# Patient Record
Sex: MALE | Race: WHITE | NOT HISPANIC OR LATINO | Employment: STUDENT | URBAN - METROPOLITAN AREA
[De-identification: names, ages, dates, MRNs, and addresses within clinical notes are randomized per-mention and may not be internally consistent; named-entity substitution may affect disease eponyms.]

---

## 2023-12-20 ENCOUNTER — OFFICE VISIT (OUTPATIENT)
Dept: OTOLARYNGOLOGY | Facility: CLINIC | Age: 18
End: 2023-12-20
Payer: COMMERCIAL

## 2023-12-20 VITALS — TEMPERATURE: 97.3 F | WEIGHT: 152 LBS | BODY MASS INDEX: 21.76 KG/M2 | HEIGHT: 70 IN

## 2023-12-20 DIAGNOSIS — J34.2 DEVIATED NASAL SEPTUM: Primary | ICD-10-CM

## 2023-12-20 DIAGNOSIS — J34.3 NASAL TURBINATE HYPERTROPHY: ICD-10-CM

## 2023-12-20 DIAGNOSIS — R04.0 EPISTAXIS: ICD-10-CM

## 2023-12-20 PROCEDURE — 1036F TOBACCO NON-USER: CPT | Performed by: OTOLARYNGOLOGY

## 2023-12-20 PROCEDURE — 99203 OFFICE O/P NEW LOW 30 MIN: CPT | Performed by: OTOLARYNGOLOGY

## 2023-12-20 ASSESSMENT — PATIENT HEALTH QUESTIONNAIRE - PHQ9
1. LITTLE INTEREST OR PLEASURE IN DOING THINGS: NOT AT ALL
SUM OF ALL RESPONSES TO PHQ9 QUESTIONS 1 & 2: 0
2. FEELING DOWN, DEPRESSED OR HOPELESS: NOT AT ALL

## 2023-12-20 NOTE — PROGRESS NOTES
"Chief Complaint   Patient presents with    New Patient Visit     NP NOSE BLEEDS FOR A LONG TIME, SEASONAL WHEN DRY      Date of Evaluation: 12/20/2023   HPI  Rory Harris is a 18 y.o. male with a history of recurrent anterior bilateral epistaxis.  No other bleeding or bruising elsewhere.  Particularly in the wintertime he gets epistaxis.  His last episode was 1 month ago.  He is aware of some difficulty breathing through the nose.  He has more frequent sinus infections and nasal congestion.  There is some question of some nasal trauma playing soccer and a childhood incident       Past Medical History:   Diagnosis Date    Encounter for immunization 05/12/2020    Need for vaccination    Encounter for immunization 11/04/2016    Encounter for immunization    Nasal congestion 04/10/2015    Nasal congestion    Pain in right hip 02/27/2015    Hip pain, right    Personal history of other diseases of the digestive system     History of esophageal reflux    Personal history of other diseases of the respiratory system 09/22/2019    History of sore throat    Personal history of other diseases of the respiratory system 11/30/2017    History of acute sinusitis    Personal history of other diseases of the respiratory system 11/30/2017    History of sore throat    Personal history of other specified conditions 01/31/2021    History of syncope    Underweight 01/31/2021    Underweight    Unspecified nonsuppurative otitis media, bilateral 11/30/2017    Bilateral serous otitis media      Past Surgical History:   Procedure Laterality Date    OTHER SURGICAL HISTORY  01/26/2021    Pectus excavatum repair          Medications:   No current outpatient medications     Allergies:  No Known Allergies     Physical Exam:  Last Recorded Vitals  Temperature 36.3 °C (97.3 °F), height 1.778 m (5' 10\"), weight 68.9 kg (152 lb).  []General appearance: Well-developed, well-nourished in no acute distress, conversant with normal voice " quality    Head/face: No erythema or edema or facial tenderness, and normal facial nerve function bilaterally    External ear: Clear external auditory canals with normal pinnae  Tube status: N/A  Middle ear: Tympanic membranes intact and mobile, middle ears normal.  Tympanic membrane perforation: N/A  Mastoid bowl: N/A  Hearing: Normal conversational awareness at normal speech thresholds    Nose visualized using: Anterior rhinoscopy  Nasal dorsum: Nontraumatic midline appearance  Septum: Severely deviated towards the right obstructing 90% of the airway.  Prominent blood vessels on the anterior septum bilaterally.  No active bleeding.  Inferior turbinates: Normal, pink, hypertrophied  Secretions: Thicker crusty secretions suctioned from the anterior nasal cavity    Oral cavity and oropharynx: Normal  Teeth: Good condition  Floor of mouth: without lesions  Palate: Normal hard palate, soft palate and uvula  Oropharynx: Clear, no lesions present  Buccal mucosa: Normal without masses or lesions  Lips: Normal    Nasopharynx: Inadequate mirror exam secondary to gag/anatomy    Neck:  Salivary glands: Normal bilateral parotid and submandibular glands by inspection and palpation.  Non-thyroid masses: No palpable masses or significant lymphadenopathy  Trachea: Midline  Thyroid: No thyromegaly or palpable nodules  Temporomandibular joint: Nontender  Cervical range of motion: Normal    Neurologic exam: Alert and oriented x3, appropriate affect.  Cranial nerves II-XII normal bilaterally  Extraocular movement: Extraocular movement intact, normal gaze alignment        Rory was seen today for new patient visit.  Diagnoses and all orders for this visit:  Deviated nasal septum (Primary)  Epistaxis  Nasal turbinate hypertrophy       PLAN  I believe the underlying cause of his epistaxis is his severely deviated nasal septum.  He has not had bleeding for a month from the nose.  I did discuss the option of cauterization versus  moisturizing with saline gel.  He would like to do saline gel.  He is aware of his degree of nasal obstruction and the impact this has had on the epistaxis.  I do not believe trying a nasal steroid would be of any benefit and may likely exacerbate the epistaxis given the severity of the deviated septum.  I did discuss the option of septoplasty and turbinate surgery to improve his airway and secondarily I think this may also help his recurrent epistaxis and sinus infections.  He is at college at this point and may want to schedule this in the near future over a break.  I discussed the surgery in detail including the risks and benefits and he and his mother will consider this option and let me know    Db Wells MD

## 2024-05-29 ENCOUNTER — DOCUMENTATION (OUTPATIENT)
Dept: OTOLARYNGOLOGY | Facility: HOSPITAL | Age: 19
End: 2024-05-29
Payer: COMMERCIAL

## 2024-05-29 NOTE — PROGRESS NOTES
OP NOTE     Date: 2024  OR Location: [facility]    Name: Rory Harris : 2005 Age: 18 y.o. MRN: [id number]  Sex: male      Diagnosis  Pre-op Diagnosis  Deviated nasal septum and turbinate hypertrophy Post-op Diagnosis     Same     Procedures  Septoplasty  Bilateral submucous resection of the inferior turbinates  Bilateral outfracture of the inferior turbinates    Surgeon:      Db Wells MD       Procedure Summary  Anesthesia: General  Specimen:   none    Operative indication: This is a 18-year-old white male with chronic nasal obstruction and recurrent epistaxis with a severely deviated nasal septum and large inferior turbinates.  The above-mentioned surgery was recommended    Operative report: The patient was brought to the operating room and placed on the operating table in the supine position.  After blood pressure and cardiac monitors were applied the patient was placed under general anesthesia and orally intubated.  The nasal cavity was decongested with cottonoid pledgets soaked in Edgar-Synephrine.  1% lidocaine with 1 100,000 epinephrine was infiltrated into the nasal septum and lateral nasal wall bilaterally.  After allowing for vasoconstriction a hemitransfixion incision was made in the left caudal aspect of the septum.  The mucoperichondrium/mucoperiosteum was elevated off of the left side of the septum.  The bony cartilaginous junction was identified.  An incision was made just anterior to this junction.  The right sided posterior mucoperiosteum was then elevated off of the posterior septum.  Numerous deformities were resected from the posterior bony septum.  The posterior inferior aspect of the quadrangular cartilage was trimmed.  The maxillary crest was lowered.  The quadrangular cartilage was rotated back into midline and now the septum was straight and nonobstructing.  The hemitransfixion incision was closed with 4-0 chromic suture.  Several transseptal quilting sutures  were passed with 4-0 chromic suture.  Attention was then directed to the inferior turbinates.  They were infiltrated with saline.  An incision was made in the anterior aspect of the inferior turbinate and a submucosal plane was elevated with a lauryn elevator.  The microdebrider with coagulation was then used to perform submucous resection.  This was carried out bilaterally.  The inferior turbinates were then outfractured bilaterally.  Silastic splints were cut and placed onto each side of the nasal septum and sewn in place with a Prolene suture.  The patient was allowed to awaken from anesthesia and was extubated in the operating room.  He was transported to the postanesthesia care unit in stable condition having tolerated the procedure well.  There were no complications.    Findings: Severely deviated nasal septum towards the left    Complications: None    Estimated blood loss: Minimal    Disposition: PACU      05/29/24 at 9:41 AM - Db Wells MD

## 2024-06-04 ENCOUNTER — OFFICE VISIT (OUTPATIENT)
Dept: OTOLARYNGOLOGY | Facility: CLINIC | Age: 19
End: 2024-06-04
Payer: COMMERCIAL

## 2024-06-04 VITALS — WEIGHT: 152 LBS | HEIGHT: 70 IN | BODY MASS INDEX: 21.76 KG/M2

## 2024-06-04 DIAGNOSIS — J34.3 NASAL TURBINATE HYPERTROPHY: ICD-10-CM

## 2024-06-04 DIAGNOSIS — J34.2 DEVIATED NASAL SEPTUM: Primary | ICD-10-CM

## 2024-06-04 PROCEDURE — 99024 POSTOP FOLLOW-UP VISIT: CPT | Performed by: OTOLARYNGOLOGY

## 2024-06-04 PROCEDURE — 1036F TOBACCO NON-USER: CPT | Performed by: OTOLARYNGOLOGY

## 2024-06-04 NOTE — PROGRESS NOTES
"Chief Complaint   Patient presents with    Post-op     DOS: 5/29/24 SEPTO/TURBS      Date of Evaluation: 6/4/2024   HPI  He returns status post septoplasty and turbinate surgery May 29, 2024.  Rory Harris is a 18 y.o. male with a history of recurrent anterior bilateral epistaxis.  No other bleeding or bruising elsewhere.  Particularly in the wintertime he gets epistaxis.  His last episode was 1 month ago.  He is aware of some difficulty breathing through the nose.  He has more frequent sinus infections and nasal congestion.  There is some question of some nasal trauma playing soccer and a childhood incident       Past Medical History:   Diagnosis Date    Encounter for immunization 05/12/2020    Need for vaccination    Encounter for immunization 11/04/2016    Encounter for immunization    Nasal congestion 04/10/2015    Nasal congestion    Pain in right hip 02/27/2015    Hip pain, right    Personal history of other diseases of the digestive system     History of esophageal reflux    Personal history of other diseases of the respiratory system 09/22/2019    History of sore throat    Personal history of other diseases of the respiratory system 11/30/2017    History of acute sinusitis    Personal history of other diseases of the respiratory system 11/30/2017    History of sore throat    Personal history of other specified conditions 01/31/2021    History of syncope    Underweight 01/31/2021    Underweight    Unspecified nonsuppurative otitis media, bilateral 11/30/2017    Bilateral serous otitis media      Past Surgical History:   Procedure Laterality Date    OTHER SURGICAL HISTORY  01/26/2021    Pectus excavatum repair          Medications:   No current outpatient medications     Allergies:  No Known Allergies     Physical Exam:  Last Recorded Vitals  Height 1.778 m (5' 10\"), weight 68.9 kg (152 lb).  []General appearance: Well-developed, well-nourished in no acute distress, conversant with normal voice " quality    Head/face: No erythema or edema or facial tenderness, and normal facial nerve function bilaterally    External ear: Clear external auditory canals with normal pinnae  Tube status: N/A  Middle ear: Tympanic membranes intact and mobile, middle ears normal.  Tympanic membrane perforation: N/A  Mastoid bowl: N/A  Hearing: Normal conversational awareness at normal speech thresholds    Nose visualized using: Anterior rhinoscopy  Nasal dorsum: Nontraumatic midline appearance  Septum: Splints removed.  Septum straight.  Turbinates debrided.  Inferior turbinates: Normal, pink, hypertrophied  Secretions: Dry    Oral cavity and oropharynx: Normal  Teeth: Good condition  Floor of mouth: without lesions  Palate: Normal hard palate, soft palate and uvula  Oropharynx: Clear, no lesions present  Buccal mucosa: Normal without masses or lesions  Lips: Normal    Nasopharynx: Inadequate mirror exam secondary to gag/anatomy    Neck:  Salivary glands: Normal bilateral parotid and submandibular glands by inspection and palpation.  Non-thyroid masses: No palpable masses or significant lymphadenopathy  Trachea: Midline  Thyroid: No thyromegaly or palpable nodules  Temporomandibular joint: Nontender  Cervical range of motion: Normal    Neurologic exam: Alert and oriented x3, appropriate affect.  Cranial nerves II-XII normal bilaterally  Extraocular movement: Extraocular movement intact, normal gaze alignment        There are no diagnoses linked to this encounter.       PLAN  Continue saline nasal spray.  Recheck in 1 week    Db Wells MD

## 2024-06-12 ENCOUNTER — APPOINTMENT (OUTPATIENT)
Dept: OTOLARYNGOLOGY | Facility: CLINIC | Age: 19
End: 2024-06-12
Payer: COMMERCIAL

## 2024-06-12 VITALS — TEMPERATURE: 100.2 F | WEIGHT: 152 LBS | HEIGHT: 70 IN | BODY MASS INDEX: 21.76 KG/M2

## 2024-06-12 DIAGNOSIS — J01.90 ACUTE NON-RECURRENT SINUSITIS, UNSPECIFIED LOCATION: ICD-10-CM

## 2024-06-12 DIAGNOSIS — J34.2 DEVIATED NASAL SEPTUM: Primary | ICD-10-CM

## 2024-06-12 DIAGNOSIS — B99.9 INFECTION: ICD-10-CM

## 2024-06-12 DIAGNOSIS — J34.3 NASAL TURBINATE HYPERTROPHY: ICD-10-CM

## 2024-06-12 PROCEDURE — 1036F TOBACCO NON-USER: CPT | Performed by: OTOLARYNGOLOGY

## 2024-06-12 PROCEDURE — 99024 POSTOP FOLLOW-UP VISIT: CPT | Performed by: OTOLARYNGOLOGY

## 2024-06-12 RX ORDER — AMOXICILLIN AND CLAVULANATE POTASSIUM 875; 125 MG/1; MG/1
1 TABLET, FILM COATED ORAL 2 TIMES DAILY
Qty: 20 TABLET | Refills: 0 | Status: SHIPPED | OUTPATIENT
Start: 2024-06-12 | End: 2024-06-22

## 2024-06-12 NOTE — PROGRESS NOTES
Chief Complaint   Patient presents with    Follow-up     1 WK F/U SINUS CK, S/P SEPTO/TURBS, HAD NOSEBLEED YESTERDAY      Date of Evaluation: 6/12/2024   HPI  He returns status post septoplasty and turbinate surgery May 29, 2024.  He had epistaxis on the right yesterday.  He is still congested  Roryyudy Harris is a 18 y.o. male with a history of recurrent anterior bilateral epistaxis.  No other bleeding or bruising elsewhere.  Particularly in the wintertime he gets epistaxis.  His last episode was 1 month ago.  He is aware of some difficulty breathing through the nose.  He has more frequent sinus infections and nasal congestion.  There is some question of some nasal trauma playing soccer and a childhood incident       Past Medical History:   Diagnosis Date    Encounter for immunization 05/12/2020    Need for vaccination    Encounter for immunization 11/04/2016    Encounter for immunization    Nasal congestion 04/10/2015    Nasal congestion    Pain in right hip 02/27/2015    Hip pain, right    Personal history of other diseases of the digestive system     History of esophageal reflux    Personal history of other diseases of the respiratory system 09/22/2019    History of sore throat    Personal history of other diseases of the respiratory system 11/30/2017    History of acute sinusitis    Personal history of other diseases of the respiratory system 11/30/2017    History of sore throat    Personal history of other specified conditions 01/31/2021    History of syncope    Underweight 01/31/2021    Underweight    Unspecified nonsuppurative otitis media, bilateral 11/30/2017    Bilateral serous otitis media      Past Surgical History:   Procedure Laterality Date    OTHER SURGICAL HISTORY  01/26/2021    Pectus excavatum repair          Medications:   Current Outpatient Medications   Medication Instructions    amoxicillin-pot clavulanate (Augmentin) 875-125 mg tablet 1 tablet, oral, 2 times daily        Allergies:  No  "Known Allergies     Physical Exam:  Last Recorded Vitals  Temperature 37.9 °C (100.2 °F), height 1.778 m (5' 10\"), weight 68.9 kg (152 lb).  []General appearance: Well-developed, well-nourished in no acute distress, conversant with normal voice quality    Head/face: No erythema or edema or facial tenderness, and normal facial nerve function bilaterally    External ear: Clear external auditory canals with normal pinnae  Tube status: N/A  Middle ear: Tympanic membranes intact and mobile, middle ears normal.  Tympanic membrane perforation: N/A  Mastoid bowl: N/A  Hearing: Normal conversational awareness at normal speech thresholds    Nose visualized using: Anterior rhinoscopy  Nasal dorsum: Nontraumatic midline appearance  Septum: Septum straight.  Prominent blood vessel cauterized on the right anterior septum.  Purulence bilaterally  turbinates: Normal, pink, hypertrophied  Secretions: Dry    Oral cavity and oropharynx: Normal  Teeth: Good condition  Floor of mouth: without lesions  Palate: Normal hard palate, soft palate and uvula  Oropharynx: Clear, no lesions present  Buccal mucosa: Normal without masses or lesions  Lips: Normal    Nasopharynx: Inadequate mirror exam secondary to gag/anatomy    Neck:  Salivary glands: Normal bilateral parotid and submandibular glands by inspection and palpation.  Non-thyroid masses: No palpable masses or significant lymphadenopathy  Trachea: Midline  Thyroid: No thyromegaly or palpable nodules  Temporomandibular joint: Nontender  Cervical range of motion: Normal    Neurologic exam: Alert and oriented x3, appropriate affect.  Cranial nerves II-XII normal bilaterally  Extraocular movement: Extraocular movement intact, normal gaze alignment        Rory was seen today for follow-up.  Diagnoses and all orders for this visit:  Deviated nasal septum (Primary)  Nasal turbinate hypertrophy  Acute non-recurrent sinusitis, unspecified location  Infection  -     amoxicillin-pot clavulanate " (Augmentin) 875-125 mg tablet; Take 1 tablet by mouth 2 times a day for 10 days.         PLAN  Continue saline nasal spray.  Recheck in 1 week.  Augmentin for 1 week.  Right anterior septal vessel cauterized with silver nitrate.  Tolerated well    Db Wells MD

## 2024-06-18 ENCOUNTER — APPOINTMENT (OUTPATIENT)
Dept: OTOLARYNGOLOGY | Facility: CLINIC | Age: 19
End: 2024-06-18
Payer: COMMERCIAL

## 2024-06-18 VITALS — BODY MASS INDEX: 21.76 KG/M2 | WEIGHT: 152 LBS | HEIGHT: 70 IN

## 2024-06-18 DIAGNOSIS — J34.3 NASAL TURBINATE HYPERTROPHY: ICD-10-CM

## 2024-06-18 DIAGNOSIS — J34.2 DEVIATED NASAL SEPTUM: Primary | ICD-10-CM

## 2024-06-18 PROCEDURE — 1036F TOBACCO NON-USER: CPT | Performed by: OTOLARYNGOLOGY

## 2024-06-18 PROCEDURE — 99024 POSTOP FOLLOW-UP VISIT: CPT | Performed by: OTOLARYNGOLOGY

## 2024-06-18 NOTE — PROGRESS NOTES
Chief Complaint   Patient presents with    Follow-up     EP- 1 WK F/U SEPTO/TURBS NOSE BLEEDS      Date of Evaluation: 6/18/2024   HPI  He returns status post septoplasty and turbinate surgery May 29, 2024.  Breathing better   Rory Harris is a 18 y.o. male with a history of recurrent anterior bilateral epistaxis.  No other bleeding or bruising elsewhere.  Particularly in the wintertime he gets epistaxis.  His last episode was 1 month ago.  He is aware of some difficulty breathing through the nose.  He has more frequent sinus infections and nasal congestion.  There is some question of some nasal trauma playing soccer and a childhood incident       Past Medical History:   Diagnosis Date    Encounter for immunization 05/12/2020    Need for vaccination    Encounter for immunization 11/04/2016    Encounter for immunization    Nasal congestion 04/10/2015    Nasal congestion    Pain in right hip 02/27/2015    Hip pain, right    Personal history of other diseases of the digestive system     History of esophageal reflux    Personal history of other diseases of the respiratory system 09/22/2019    History of sore throat    Personal history of other diseases of the respiratory system 11/30/2017    History of acute sinusitis    Personal history of other diseases of the respiratory system 11/30/2017    History of sore throat    Personal history of other specified conditions 01/31/2021    History of syncope    Underweight 01/31/2021    Underweight    Unspecified nonsuppurative otitis media, bilateral 11/30/2017    Bilateral serous otitis media      Past Surgical History:   Procedure Laterality Date    OTHER SURGICAL HISTORY  01/26/2021    Pectus excavatum repair          Medications:   Current Outpatient Medications   Medication Instructions    amoxicillin-pot clavulanate (Augmentin) 875-125 mg tablet 1 tablet, oral, 2 times daily        Allergies:  No Known Allergies     Physical Exam:  Last Recorded Vitals  Height 1.778 m  "(5' 10\"), weight 68.9 kg (152 lb).  []General appearance: Well-developed, well-nourished in no acute distress, conversant with normal voice quality    Head/face: No erythema or edema or facial tenderness, and normal facial nerve function bilaterally    External ear: Clear external auditory canals with normal pinnae  Tube status: N/A  Middle ear: Tympanic membranes intact and mobile, middle ears normal.  Tympanic membrane perforation: N/A  Mastoid bowl: N/A  Hearing: Normal conversational awareness at normal speech thresholds    Nose visualized using: Anterior rhinoscopy  Nasal dorsum: Nontraumatic midline appearance  Septum: Septum straight.  Nose suctioned to clear   turbinates: Normal, pink, hypertrophied  Secretions: Dry    Oral cavity and oropharynx: Normal  Teeth: Good condition  Floor of mouth: without lesions  Palate: Normal hard palate, soft palate and uvula  Oropharynx: Clear, no lesions present  Buccal mucosa: Normal without masses or lesions  Lips: Normal    Nasopharynx: Inadequate mirror exam secondary to gag/anatomy    Neck:  Salivary glands: Normal bilateral parotid and submandibular glands by inspection and palpation.  Non-thyroid masses: No palpable masses or significant lymphadenopathy  Trachea: Midline  Thyroid: No thyromegaly or palpable nodules  Temporomandibular joint: Nontender  Cervical range of motion: Normal    Neurologic exam: Alert and oriented x3, appropriate affect.  Cranial nerves II-XII normal bilaterally  Extraocular movement: Extraocular movement intact, normal gaze alignment        There are no diagnoses linked to this encounter.         PLAN  Continue saline nasal spray.  Recheck in 1 week.    Db Wells MD    "

## 2024-06-25 ENCOUNTER — APPOINTMENT (OUTPATIENT)
Dept: OTOLARYNGOLOGY | Facility: CLINIC | Age: 19
End: 2024-06-25
Payer: COMMERCIAL

## 2024-06-25 VITALS — BODY MASS INDEX: 21.76 KG/M2 | WEIGHT: 152 LBS | HEIGHT: 70 IN

## 2024-06-25 DIAGNOSIS — J34.3 NASAL TURBINATE HYPERTROPHY: ICD-10-CM

## 2024-06-25 DIAGNOSIS — J34.2 DEVIATED NASAL SEPTUM: Primary | ICD-10-CM

## 2024-06-25 PROCEDURE — 99024 POSTOP FOLLOW-UP VISIT: CPT | Performed by: OTOLARYNGOLOGY

## 2024-06-25 PROCEDURE — 1036F TOBACCO NON-USER: CPT | Performed by: OTOLARYNGOLOGY

## 2024-06-25 NOTE — PROGRESS NOTES
"Chief Complaint   Patient presents with    Follow-up     EP- 1 WK F/U SEPTO/TURBS, NOSEBLEEDS      Date of Evaluation: 6/25/2024   HPI  He returns status post septoplasty and turbinate surgery May 29, 2024.  Breathing better.  He had a small episode of epistaxis on the right when he pushed himself last week  Rory Harris is a 18 y.o. male with a history of recurrent anterior bilateral epistaxis.  No other bleeding or bruising elsewhere.  Particularly in the wintertime he gets epistaxis.  His last episode was 1 month ago.  He is aware of some difficulty breathing through the nose.  He has more frequent sinus infections and nasal congestion.  There is some question of some nasal trauma playing soccer and a childhood incident       Past Medical History:   Diagnosis Date    Encounter for immunization 05/12/2020    Need for vaccination    Encounter for immunization 11/04/2016    Encounter for immunization    Nasal congestion 04/10/2015    Nasal congestion    Pain in right hip 02/27/2015    Hip pain, right    Personal history of other diseases of the digestive system     History of esophageal reflux    Personal history of other diseases of the respiratory system 09/22/2019    History of sore throat    Personal history of other diseases of the respiratory system 11/30/2017    History of acute sinusitis    Personal history of other diseases of the respiratory system 11/30/2017    History of sore throat    Personal history of other specified conditions 01/31/2021    History of syncope    Underweight 01/31/2021    Underweight    Unspecified nonsuppurative otitis media, bilateral 11/30/2017    Bilateral serous otitis media      Past Surgical History:   Procedure Laterality Date    OTHER SURGICAL HISTORY  01/26/2021    Pectus excavatum repair          Medications:   No current outpatient medications       Allergies:  No Known Allergies     Physical Exam:  Last Recorded Vitals  Height 1.778 m (5' 10\"), weight 68.9 kg (152 " lb).  []General appearance: Well-developed, well-nourished in no acute distress, conversant with normal voice quality    Head/face: No erythema or edema or facial tenderness, and normal facial nerve function bilaterally    External ear: Clear external auditory canals with normal pinnae  Tube status: N/A  Middle ear: Tympanic membranes intact and mobile, middle ears normal.  Tympanic membrane perforation: N/A  Mastoid bowl: N/A  Hearing: Normal conversational awareness at normal speech thresholds    Nose visualized using: Anterior rhinoscopy  Nasal dorsum: Nontraumatic midline appearance  Septum: Septum straight.  Nose suctioned clear   turbinates: Normal, pink, reduced in size  Secretions: Dry    Oral cavity and oropharynx: Normal  Teeth: Good condition  Floor of mouth: without lesions  Palate: Normal hard palate, soft palate and uvula  Oropharynx: Clear, no lesions present  Buccal mucosa: Normal without masses or lesions  Lips: Normal    Nasopharynx: Inadequate mirror exam secondary to gag/anatomy    Neck:  Salivary glands: Normal bilateral parotid and submandibular glands by inspection and palpation.  Non-thyroid masses: No palpable masses or significant lymphadenopathy  Trachea: Midline  Thyroid: No thyromegaly or palpable nodules  Temporomandibular joint: Nontender  Cervical range of motion: Normal    Neurologic exam: Alert and oriented x3, appropriate affect.  Cranial nerves II-XII normal bilaterally  Extraocular movement: Extraocular movement intact, normal gaze alignment        Rory was seen today for follow-up.  Diagnoses and all orders for this visit:  Deviated nasal septum (Primary)  Nasal turbinate hypertrophy           PLAN  Saline nasal gel to the anterior septum twice daily for 2 weeks.  Recheck as needed  Db Wells MD